# Patient Record
(demographics unavailable — no encounter records)

---

## 2025-01-30 NOTE — HISTORY OF PRESENT ILLNESS
[N] : Patient reports normal menses [Y] : Positive pregnancy history [Menarche Age: ____] : age at menarche was [unfilled] [No] : Patient does not have concerns regarding sex [Currently Active] : currently active